# Patient Record
Sex: FEMALE | Race: WHITE
[De-identification: names, ages, dates, MRNs, and addresses within clinical notes are randomized per-mention and may not be internally consistent; named-entity substitution may affect disease eponyms.]

---

## 2018-05-01 ENCOUNTER — HOSPITAL ENCOUNTER (OUTPATIENT)
Dept: HOSPITAL 45 - C.MAMM | Age: 23
Discharge: HOME | End: 2018-05-01
Attending: PHYSICIAN ASSISTANT
Payer: COMMERCIAL

## 2018-05-01 DIAGNOSIS — N63.23: Primary | ICD-10-CM

## 2018-05-01 NOTE — MAMMOGRAPHY REPORT
ULTRASOUND OF BOTH BREASTS: 5/1/2018

CLINICAL HISTORY: 23-year-old woman presents for workup after her provider palpated a lump in the 4:0
0 left breast on physical exam.  No skin erythema or thickening.  No nipple discharge.  Family histor
y of breast cancer = maternal great aunt and third cousins.  





COMPARISON: No prior exams were available for comparison.   



FINDINGS: The patient pointed out the lump in the 4:00 left breast, 1 cm from the nipple.  On palpati
on, there is a firm round 3 cm mobile mass.  On ultrasound targeted in the 4:00 left breast, 1 cm fro
m the nipple, there is an oval parallel circumscribed hypoechoic solid mass measuring 3.2 x 2.4 x 3.3
 cm.  Although this most likely represents a benign fibroadenoma, definitive characterization with an
 ultrasound-guided core biopsy is recommended.





IMPRESSION: ACR BI-RADS CATEGORY 4: SUSPICIOUS - FOLLOW-UP RECOMMENDED

Left breast ultrasound-guided core biopsy is recommended for a solid palpable 3.3 cm mass in the 4:00
 axis.



These results and recommendations were discussed with the patient and her mother at the time of the e
xam.  She tentatively scheduled the biopsy prior to leaving our department.

Shona Arredondo M.D.  

ay/:5/1/2018 14:26:30  



Imaging Technologist: Dr. Shona Arredondo, [facility]

letter sent: Abnormal 4/5  

BI-RADS Code: ACR BI-RADS Category 4: Suspicious

## 2018-05-04 ENCOUNTER — HOSPITAL ENCOUNTER (OUTPATIENT)
Dept: HOSPITAL 45 - C.MAMM | Age: 23
Discharge: HOME | End: 2018-05-04
Attending: PHYSICIAN ASSISTANT
Payer: COMMERCIAL

## 2018-05-04 DIAGNOSIS — N63.20: Primary | ICD-10-CM

## 2018-05-04 NOTE — DISCHARGE INSTRUCTIONS
Discharge Instructions


Procedure


Procedure Date:


May 4, 2018.


Reason for visit:


Left Mass.





Discharge


Discharge Date:


May 4, 2018.


Discharge Diagnosis:


status post breast biopsy





Instructions


Activity Recommendations:  Additional Limitations (see below)


Return to School/Work:  no limitations


Recommended Home Diet:  No Limitations


Provider Instructions:





ACTIVITY RECOMMENDATIONS:





*  No lifting, pushing, pulling or exercising the affected side for three days.








RETURN TO SCHOOL/WORK:





*  You may return to work/school after the procedure, but do not perform any 

strenuous


   activities for 24 to 48 hours.








MEDICATIONS:





*  Tylenol (two 325 mg) every four to six hours if needed for mild pain (if not 

allergic to Tylenol).








DIET:





*  Resume previous diet.








SPECIAL CARE INSTRUCTIONS:





*  Keep biopsy site dry for 24 hours.  May shower after 24 hours, but do not 

soak (bathe)


   incision.





*  May remove Tegaderm (plastic patch) tomorrow AFTER showering.





*  Leave the steri-strips on for one week.  Allow the steri-strips to fall off 

by themselves.  


   If not off after one week, you may remove them.  You may place a Bandaid


   crosswise over the strips, if desired.





*  Apply ice 10 minutes on and 10 minutes off as needed.





*  Wear a bra at bedtime to sleep more comfortably for 2-3 days.





*  Your referring physician should have the results after approximately 5 to 7 

business days.





*  Call for unusual bleeding, fever, drainage, etc or if you have any questions 

call the breast center at


  [phone]. 








FOLLOW UP VISIT:





Follow-up with Referring Physician as scheduled.





Jensen Rankin Recommendations:


 


Call your doctor if:





*  Temperature above 101 degrees


*  Pain not relieved by pain medicine ordered


*  There is increased drainage or redness from any incision


*  You have any unanswered questions or concerns.





Your Doctors Instructions noted above were prepared by provider Malaika Nicholson.


Patient Signature Section:





 Patient Instructions Signature Page














Ashley Carr 











Patient (or Guardian) Signature/Date:____________________________________ I 

have read and understand the instructions given to me by my caregivers.








Caregiver/RN/Doctor Signature/Date:____________________________________











The above-named patient and/or guardian has received patient instructions on 

this date.





























+  Original Patient Signature Page (only) stays with chart.  Please make copy 

for patient.

## 2018-05-04 NOTE — MAMMOGRAPHY REPORT
ULTRASOUND GUIDED BIOPSY LEFT BREAST: 5/4/2018

CLINICAL HISTORY: Left 4:00 breast mass.  



PATIENT CONSENT: The procedure, risks and benefits were discussed with the patient and informed writt
en consent was obtained. A timeout was performed immediately prior to the procedure.    



PROCEDURE DESCRIPTION: With ultrasound guidance, aseptic technique, and lidocaine as the local anesth
etic (1% lidocaine to anesthetize the skin and 1% lidocaine with epinephrine to anesthetize the deepe
r tissues), the mass of concern in the left 4:00 breast was sampled 4 times with a 14-gauge Achieve b
iopsy needle.   Immediately thereafter, with ultrasound guidance, aseptic technique, and lidocaine as
 the local anesthetic, a metallic localizer clip was placed centrally in the mass.  Direct pressure w
as applied to the site immediately post procedure and hemostasis was achieved. The patient tolerated 
the procedure without complication.  She was given wound care instructions. The specimens were sent t
o pathology for analysis.  





COMPARISON: Comparison is made to exam dated:  5/1/2018 ultrasound - [facility].   








IMPRESSION: ULTRASOUND GUIDED BIOPSY

Ultrasound-guided core needle biopsy of the left 4:00 breast mass, with clip placement.  The patient 
will receive pathology results from her referring provider.



Malaika Nicholson M.D.  

/:5/4/2018 08:49:05  



Imaging Technologist: Krystal LINDSEY)(AXEL), [facility]